# Patient Record
Sex: FEMALE | Race: WHITE | NOT HISPANIC OR LATINO
[De-identification: names, ages, dates, MRNs, and addresses within clinical notes are randomized per-mention and may not be internally consistent; named-entity substitution may affect disease eponyms.]

---

## 2018-08-03 ENCOUNTER — RESULT REVIEW (OUTPATIENT)
Age: 48
End: 2018-08-03

## 2020-06-24 ENCOUNTER — RESULT REVIEW (OUTPATIENT)
Age: 50
End: 2020-06-24

## 2020-11-23 PROBLEM — Z00.00 ENCOUNTER FOR PREVENTIVE HEALTH EXAMINATION: Status: ACTIVE | Noted: 2020-11-23

## 2020-12-01 ENCOUNTER — APPOINTMENT (OUTPATIENT)
Dept: VASCULAR SURGERY | Facility: CLINIC | Age: 50
End: 2020-12-01
Payer: COMMERCIAL

## 2020-12-01 VITALS — HEIGHT: 68 IN | BODY MASS INDEX: 20.76 KG/M2 | WEIGHT: 137 LBS

## 2020-12-01 PROCEDURE — 93970 EXTREMITY STUDY: CPT

## 2020-12-01 PROCEDURE — 99072 ADDL SUPL MATRL&STAF TM PHE: CPT

## 2020-12-01 PROCEDURE — 99204 OFFICE O/P NEW MOD 45 MIN: CPT

## 2020-12-01 NOTE — PHYSICAL EXAM
[Normal Thyroid] : the thyroid was normal [Normal Breath Sounds] : Normal breath sounds [Normal Heart Sounds] : normal heart sounds [Ankle Swelling (On Exam)] : present [Ankle Swelling Bilaterally] : bilaterally  [Varicose Veins Of Lower Extremities] : bilaterally [No HSM] : no hepatosplenomegaly [Alert] : alert [Oriented to Person] : oriented to person [Oriented to Place] : oriented to place [Oriented to Time] : oriented to time [Calm] : calm [JVD] : no jugular venous distention  [] : not present [de-identified] : NAD. Awake and alert [de-identified] : Normocephalic atraumatic [FreeTextEntry1] : All pulses palpable.\par Bilateral lower extremity varicose veins right significantly worse than the left.  Veins extensive inner thigh and calf both especially the lateral aspect of the leg.  Tender to touch.  Multiple spider veins extending into the foot.  Extensive ankle swelling on the right side from varicose veins in the calf and ankle.  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 [de-identified] : Soft nontender nondistended.  No masses [de-identified] : No CVA tenderness [de-identified] : Normal muscle bulk and tone [de-identified] : No ulcers or lesions [de-identified] : Strength 5 out of 5 throughout.  Sensation intact.

## 2020-12-01 NOTE — HISTORY OF PRESENT ILLNESS
[FreeTextEntry1] : Patient is a 50-year-old female with past medical history of bilateral lower extremity varicose veins and symptomatic venous reflux status post bilateral lower extremity venous closures years ago.  Venous closures only transiently relieved her symptoms.  Currently she complains of bilateral varicose veins that are tender, leg swelling, burning and heaviness. Edema is chronic, present for years. Progressively got worse over time. Least pronounced in the morning and worsens during the course of the day. Alleviated by leg elevation. Exacerbated by prolonged standing, activity and warm weather. Lifestyle limiting. Patient complains of tenderness, heaviness, burning and significant discomfort, all of which are lifestyle-limiting. \par Patient attempted compressive therapy in the past with only a marginal improvement of the symptoms. However, the patient states that She was not fully compliant and consistent with compressive therapy. \par Varicose veins are present and pronounced bilaterally with right leg being much worse than the left.  Symptoms exacerbated by standing activity and warm weather.  Patient is a  and the symptoms are lifestyle limiting.\par No history of cellulitis, ulcers. \par No erythema. 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

## 2020-12-01 NOTE — CONSULT LETTER
[Dear  ___] : Dear  [unfilled], [Consult Letter:] : I had the pleasure of evaluating your patient, [unfilled]. [Please see my note below.] : Please see my note below. [Consult Closing:] : Thank you very much for allowing me to participate in the care of this patient.  If you have any questions, please do not hesitate to contact me. 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  She previously undergone endovenous ablation and sclerotherapy without substantial relief of her symptoms.  The veins remain pronounced and symptomatic.  She has been compliant with nonoperative management without substantial relief as well.  We discussed the range of procedures versus nonoperative management and she would like to proceed with stab phlebectomy and sclerotherapy of her left leg. [FreeTextEntry3] : Jazmin Diez

## 2020-12-01 NOTE — PROCEDURE
[FreeTextEntry1] : Venous ultrasound demonstrated no evidence of hemodynamically significant reflux in bilateral greater saphenous and lesser saphenous veins.  Lesser saphenous vein is small.\par Multiple varicose veins present.

## 2020-12-01 NOTE — ASSESSMENT
[FreeTextEntry1] : Patient is a 50-year-old female with bilateral lower extremity symptomatic varicose veins right much worse than left.  Patient failed nonoperative management and remains symptomatic despite wearing compression socks for years.  We will proceed with right lower extremity stab phlebectomy followed by sclerotherapy.  Left leg varicose veins amenable to sclerotherapy.

## 2021-01-25 ENCOUNTER — RESULT REVIEW (OUTPATIENT)
Age: 51
End: 2021-01-25

## 2021-01-28 ENCOUNTER — APPOINTMENT (OUTPATIENT)
Dept: VASCULAR SURGERY | Facility: HOSPITAL | Age: 51
End: 2021-01-28

## 2021-02-02 ENCOUNTER — APPOINTMENT (OUTPATIENT)
Dept: VASCULAR SURGERY | Facility: CLINIC | Age: 51
End: 2021-02-02
Payer: COMMERCIAL

## 2021-02-02 DIAGNOSIS — I83.893 VARICOSE VEINS OF BILATERAL LOWER EXTREMITIES WITH OTHER COMPLICATIONS: ICD-10-CM

## 2021-02-02 PROCEDURE — 99024 POSTOP FOLLOW-UP VISIT: CPT

## 2021-02-03 NOTE — PHYSICAL EXAM
[JVD] : no jugular venous distention  [2+] : left 2+ [Ankle Swelling (On Exam)] : present [Ankle Swelling Bilaterally] : bilaterally  [Varicose Veins Of Lower Extremities] : bilaterally [] : not present [de-identified] : NAD. Awake and alert [de-identified] : NCAT  [de-identified] : normal muscle bulk and tone [de-identified] : post-op ecchymosis of RLE. Inc- clean, dry, healing well,.  alteration in breathing pattern

## 2021-02-03 NOTE — DISCUSSION/SUMMARY
[FreeTextEntry1] : 49 yo F with b/l LE extensive varicose veins. \par Now s/p RLE posterior leg stab phlebectomy with over 50 stab inc. All sutures removed. Steri-strips applied. \par She would like to proceed with removal of extensive anterior leg varicosities and left left stabs. \par She expressed understanding of potential need to stage the stabs due to extensive nature of the veins and their circumferential distribution.

## 2021-02-03 NOTE — REVIEW OF SYSTEMS
[Fever] : no fever [Chills] : no chills [Limb Pain] : limb pain [Limb Swelling] : limb swelling [Negative] : Heme/Lymph

## 2021-02-03 NOTE — REASON FOR VISIT
[de-identified] : RLE stab phlebectomy  [de-identified] : 5 [de-identified] : 1 [de-identified] : patient s/p Stab phlebectomy. \par Doing well\par Wears compression. \par Residual veins of anterior leg remain tender.

## 2021-02-17 PROBLEM — I83.893 VARICOSE VEINS OF BILATERAL LOWER EXTREMITIES WITH OTHER COMPLICATIONS: Status: ACTIVE | Noted: 2021-02-17

## 2021-03-09 ENCOUNTER — RESULT REVIEW (OUTPATIENT)
Age: 51
End: 2021-03-09

## 2021-03-12 ENCOUNTER — APPOINTMENT (OUTPATIENT)
Dept: VASCULAR SURGERY | Facility: HOSPITAL | Age: 51
End: 2021-03-12

## 2021-03-16 ENCOUNTER — APPOINTMENT (OUTPATIENT)
Dept: VASCULAR SURGERY | Facility: CLINIC | Age: 51
End: 2021-03-16
Payer: COMMERCIAL

## 2021-03-16 PROCEDURE — 99024 POSTOP FOLLOW-UP VISIT: CPT

## 2021-03-16 NOTE — PHYSICAL EXAM
[2+] : left 2+ [Ankle Swelling (On Exam)] : present [Ankle Swelling Bilaterally] : bilaterally  [Varicose Veins Of Lower Extremities] : present [JVD] : no jugular venous distention  [Varicose Veins Of The Left Leg] : of the left leg [] : not present [de-identified] : NAD. Awake and alert [de-identified] : NCAT  [de-identified] : Soft, NT, ND. No guarding. No palpable masses. No HSM [de-identified] : normal muscle bulk and tone [de-identified] : post-op ecchymosis of RLE. Inc- clean, dry, healing well,.

## 2021-03-16 NOTE — REVIEW OF SYSTEMS
[Limb Pain] : limb pain [Limb Swelling] : limb swelling [Negative] : Heme/Lymph [Fever] : no fever [Chills] : no chills

## 2021-03-16 NOTE — REASON FOR VISIT
[de-identified] : RLE stab phlebectomy- anterior leg [de-identified] : 5 [de-identified] : 1 [de-identified] : patient s/p Stab phlebectomy. \par Doing well\par Wears compression. \par Pain controlled\par Residual veins of anterior leg remain tender.

## 2021-03-16 NOTE — DISCUSSION/SUMMARY
[FreeTextEntry1] : 49 yo F with b/l LE extensive varicose veins. \par Now s/p RLE posterior and anterior leg stab phlebectomy with over 50 stab inc. All sutures removed. Steri-strips applied. \par

## 2023-01-24 ENCOUNTER — APPOINTMENT (OUTPATIENT)
Dept: GASTROENTEROLOGY | Facility: CLINIC | Age: 53
End: 2023-01-24